# Patient Record
Sex: MALE | Race: BLACK OR AFRICAN AMERICAN | NOT HISPANIC OR LATINO | URBAN - METROPOLITAN AREA
[De-identification: names, ages, dates, MRNs, and addresses within clinical notes are randomized per-mention and may not be internally consistent; named-entity substitution may affect disease eponyms.]

---

## 2022-08-23 ENCOUNTER — MEDICAL CORRESPONDENCE (OUTPATIENT)
Dept: HEALTH INFORMATION MANAGEMENT | Facility: CLINIC | Age: 42
End: 2022-08-23

## 2022-08-25 ENCOUNTER — TRANSCRIBE ORDERS (OUTPATIENT)
Dept: OTHER | Age: 42
End: 2022-08-25

## 2022-08-25 DIAGNOSIS — E11.8 TYPE 2 DIABETES MELLITUS WITH COMPLICATION, WITHOUT LONG-TERM CURRENT USE OF INSULIN (H): Primary | ICD-10-CM

## 2022-09-15 DIAGNOSIS — E11.9 TYPE 2 DIABETES MELLITUS (H): Primary | ICD-10-CM

## 2023-06-19 DIAGNOSIS — Z01.00 DIABETIC EYE EXAM (H): Primary | ICD-10-CM

## 2023-06-19 DIAGNOSIS — E11.9 DIABETIC EYE EXAM (H): Primary | ICD-10-CM

## 2023-06-19 PROBLEM — I10 ESSENTIAL HYPERTENSION: Status: ACTIVE | Noted: 2018-10-02

## 2023-06-19 PROBLEM — E11.8 TYPE 2 DIABETES MELLITUS WITH COMPLICATION, WITHOUT LONG-TERM CURRENT USE OF INSULIN (H): Status: ACTIVE | Noted: 2018-10-02

## 2023-06-22 DIAGNOSIS — E11.9 DIABETIC EYE EXAM (H): Primary | ICD-10-CM

## 2023-06-22 DIAGNOSIS — Z01.00 DIABETIC EYE EXAM (H): Primary | ICD-10-CM

## 2023-06-22 NOTE — PROGRESS NOTES
HPI:  Patient presents for a diabetic eye exam. He has type 2 diabetes mellitus. Vision is stable.     Phone .       Pertinent Medical History:    Type 2 diabetes mellitus    Hypertension    Ocular History:    None    Eye Medications:    None    Assessment and Plan:    #   No diabetic retinopathy, both eyes.     Last HA1C was unknown.     Goal is to keep HA1C under 7.0 to prevent blindness.     Macular OCT 06/27/2023: Both eyes: no cme    Recommend annual diabetic eye exam with macular OCT    #   Presbyopia, both eyes.     Progressives vs reading only discussed. Adaptation needed.     #   Abnormal Conjunctival Lesion, left eye.     Noted 06/27/2023    Conjunctival lesion nasal - looks abnormal and vascularized.     Recommend UV protection.     Recommend follow up with Vaibhav Eubanks/[facility] - patient understands concerns for cancer risks and declines follow up with a specialist. Patient also declines photos today. Patient is adamant that the conjunctival lesion on his eye is normal for him despite of my concerns for abnormal growth/cancer.     #   Cataract, both eyes.     Not visually significant.     Recommend UV protection.     Recommend annual dilated eye exam.         Patient consented to a dilated eye exam:    Yes. Side effects discussed.    Medical History:  No past medical history on file.    Medications:  No current outpatient medications on file.   Complete documentation of historical and exam elements from today's encounter can be found in the full encounter summary report (not reduplicated in this progress note). I personally obtained the chief complaint(s) and history of present illness.  I confirmed and edited as necessary the review of systems, past medical/surgical history, family history, social history, and examination findings as documented by others; and I examined the patient myself. I personally reviewed the relevant tests, images, and reports as documented above. I formulated and  edited as necessary the assessment and plan and discussed the findings and management plan with the patient and family. - Radhika Workman OD

## 2023-06-27 ENCOUNTER — OFFICE VISIT (OUTPATIENT)
Dept: OPHTHALMOLOGY | Facility: CLINIC | Age: 43
End: 2023-06-27
Attending: STUDENT IN AN ORGANIZED HEALTH CARE EDUCATION/TRAINING PROGRAM
Payer: COMMERCIAL

## 2023-06-27 DIAGNOSIS — H11.9 CONJUNCTIVAL LESION: ICD-10-CM

## 2023-06-27 DIAGNOSIS — Z01.00 DIABETIC EYE EXAM (H): ICD-10-CM

## 2023-06-27 DIAGNOSIS — E11.9 DIABETIC EYE EXAM (H): ICD-10-CM

## 2023-06-27 DIAGNOSIS — E11.8 TYPE 2 DIABETES MELLITUS WITH COMPLICATION, WITHOUT LONG-TERM CURRENT USE OF INSULIN (H): ICD-10-CM

## 2023-06-27 DIAGNOSIS — H25.13 NUCLEAR SENILE CATARACT OF BOTH EYES: ICD-10-CM

## 2023-06-27 DIAGNOSIS — H52.4 PRESBYOPIA OF BOTH EYES: Primary | ICD-10-CM

## 2023-06-27 PROCEDURE — G0463 HOSPITAL OUTPT CLINIC VISIT: HCPCS | Performed by: OPTOMETRIST

## 2023-06-27 PROCEDURE — 92134 CPTRZ OPH DX IMG PST SGM RTA: CPT | Performed by: OPTOMETRIST

## 2023-06-27 PROCEDURE — 92015 DETERMINE REFRACTIVE STATE: CPT

## 2023-06-27 PROCEDURE — 92004 COMPRE OPH EXAM NEW PT 1/>: CPT | Performed by: OPTOMETRIST

## 2023-06-27 RX ORDER — LISINOPRIL/HYDROCHLOROTHIAZIDE 10-12.5 MG
1 TABLET ORAL DAILY
COMMUNITY
Start: 2023-06-14

## 2023-06-27 RX ORDER — ATORVASTATIN CALCIUM 20 MG/1
1 TABLET, FILM COATED ORAL
COMMUNITY
Start: 2023-06-13

## 2023-06-27 ASSESSMENT — CONF VISUAL FIELD
OS_SUPERIOR_NASAL_RESTRICTION: 0
OD_INFERIOR_TEMPORAL_RESTRICTION: 0
OD_INFERIOR_NASAL_RESTRICTION: 0
OD_NORMAL: 1
OS_SUPERIOR_TEMPORAL_RESTRICTION: 0
OS_INFERIOR_TEMPORAL_RESTRICTION: 0
OD_SUPERIOR_TEMPORAL_RESTRICTION: 0
OS_INFERIOR_NASAL_RESTRICTION: 0
METHOD: COUNTING FINGERS
OD_SUPERIOR_NASAL_RESTRICTION: 0
OS_NORMAL: 1

## 2023-06-27 ASSESSMENT — TONOMETRY
OD_IOP_MMHG: 19
OS_IOP_MMHG: 17
IOP_METHOD: TONOPEN

## 2023-06-27 ASSESSMENT — REFRACTION_MANIFEST
OD_ADD: +1.25
OS_CYLINDER: +0.50
OS_ADD: +1.25
OS_AXIS: 180
OD_SPHERE: +0.25
OS_SPHERE: -0.25

## 2023-06-27 ASSESSMENT — EXTERNAL EXAM - RIGHT EYE: OD_EXAM: NORMAL

## 2023-06-27 ASSESSMENT — SLIT LAMP EXAM - LIDS
COMMENTS: NORMAL
COMMENTS: NORMAL

## 2023-06-27 ASSESSMENT — VISUAL ACUITY
METHOD: SNELLEN - LINEAR
OS_SC: 20/20
OD_SC+: -3
OD_SC: 20/20
OS_SC+: -1

## 2023-06-27 ASSESSMENT — EXTERNAL EXAM - LEFT EYE: OS_EXAM: NORMAL

## 2023-06-27 NOTE — NURSING NOTE
Chief Complaints and History of Present Illnesses   Patient presents with     New Patient     Chief Complaint(s) and History of Present Illness(es)     New Patient            Laterality: both eyes    Associated symptoms: Negative for flashes and floaters    Treatments tried: no treatments    Pain scale: 0/10          Comments    DM eye exam.    The patient reports stable vision.  The patient has never worn eyeglasses.  MARCELLO Malone, COA 10:18 AM 06/27/2023    .

## 2023-10-31 ENCOUNTER — MEDICAL CORRESPONDENCE (OUTPATIENT)
Dept: HEALTH INFORMATION MANAGEMENT | Facility: CLINIC | Age: 43
End: 2023-10-31
Payer: COMMERCIAL

## 2023-10-31 ENCOUNTER — TRANSCRIBE ORDERS (OUTPATIENT)
Dept: OTHER | Age: 43
End: 2023-10-31

## 2023-10-31 DIAGNOSIS — R94.120 ABNORMAL HEARING SCREEN: Primary | ICD-10-CM
